# Patient Record
Sex: FEMALE | Race: WHITE | NOT HISPANIC OR LATINO | Employment: OTHER | URBAN - METROPOLITAN AREA
[De-identification: names, ages, dates, MRNs, and addresses within clinical notes are randomized per-mention and may not be internally consistent; named-entity substitution may affect disease eponyms.]

---

## 2024-03-23 ENCOUNTER — HOSPITAL ENCOUNTER (OUTPATIENT)
Facility: HOSPITAL | Age: 64
Discharge: HOME | End: 2024-03-23
Attending: EMERGENCY MEDICINE
Payer: MEDICARE

## 2024-03-23 VITALS
OXYGEN SATURATION: 98 % | HEART RATE: 98 BPM | BODY MASS INDEX: 21 KG/M2 | DIASTOLIC BLOOD PRESSURE: 55 MMHG | WEIGHT: 123 LBS | RESPIRATION RATE: 18 BRPM | HEIGHT: 64 IN | SYSTOLIC BLOOD PRESSURE: 127 MMHG | TEMPERATURE: 97.9 F

## 2024-03-23 DIAGNOSIS — Z79.01 CHRONIC ANTICOAGULATION: ICD-10-CM

## 2024-03-23 DIAGNOSIS — S51.811A SKIN TEAR OF FOREARM WITHOUT COMPLICATION, RIGHT, INITIAL ENCOUNTER: Primary | ICD-10-CM

## 2024-03-23 PROCEDURE — 25010000002 TETANUS-DIPHTH-ACELL PERTUSSIS 5-2.5-18.5 LF-MCG/0.5 SUSPENSION PREFILLED SYRINGE: Performed by: NURSE PRACTITIONER

## 2024-03-23 PROCEDURE — 90471 IMMUNIZATION ADMIN: CPT | Performed by: NURSE PRACTITIONER

## 2024-03-23 PROCEDURE — 99204 OFFICE O/P NEW MOD 45 MIN: CPT | Performed by: NURSE PRACTITIONER

## 2024-03-23 PROCEDURE — G0463 HOSPITAL OUTPT CLINIC VISIT: HCPCS | Performed by: NURSE PRACTITIONER

## 2024-03-23 PROCEDURE — 90715 TDAP VACCINE 7 YRS/> IM: CPT | Performed by: NURSE PRACTITIONER

## 2024-03-23 RX ORDER — CEPHALEXIN 500 MG/1
500 CAPSULE ORAL 3 TIMES DAILY
Qty: 21 CAPSULE | Refills: 0 | Status: SHIPPED | OUTPATIENT
Start: 2024-03-23 | End: 2024-03-30

## 2024-03-23 RX ORDER — AMITRIPTYLINE HYDROCHLORIDE 25 MG/1
1 TABLET, FILM COATED ORAL DAILY
COMMUNITY
Start: 2012-05-22

## 2024-03-23 RX ORDER — METOPROLOL SUCCINATE 50 MG/1
1 TABLET, EXTENDED RELEASE ORAL DAILY
COMMUNITY

## 2024-03-23 RX ORDER — CITALOPRAM 20 MG/1
1 TABLET ORAL DAILY
COMMUNITY
Start: 2012-05-22

## 2024-03-23 RX ORDER — ALBUTEROL SULFATE 90 UG/1
AEROSOL, METERED RESPIRATORY (INHALATION)
COMMUNITY

## 2024-03-23 RX ORDER — CYCLOBENZAPRINE HCL 10 MG
1 TABLET ORAL 2 TIMES DAILY PRN
COMMUNITY

## 2024-03-23 RX ORDER — APIXABAN 2.5 MG/1
1 TABLET, FILM COATED ORAL 2 TIMES DAILY
COMMUNITY

## 2024-03-23 RX ORDER — BUMETANIDE 0.5 MG/1
1 TABLET ORAL DAILY
COMMUNITY

## 2024-03-23 RX ORDER — MELATONIN
1000 DAILY
COMMUNITY

## 2024-03-23 RX ORDER — HYDROCODONE BITARTRATE AND ACETAMINOPHEN 10; 325 MG/1; MG/1
1 TABLET ORAL 4 TIMES DAILY
COMMUNITY
Start: 2012-05-29

## 2024-03-23 RX ORDER — OMEPRAZOLE 40 MG/1
1 CAPSULE, DELAYED RELEASE ORAL DAILY
COMMUNITY
Start: 2012-02-22

## 2024-03-23 RX ORDER — LISINOPRIL 20 MG/1
1 TABLET ORAL 2 TIMES DAILY
COMMUNITY

## 2024-03-23 RX ADMIN — TETANUS TOXOID, REDUCED DIPHTHERIA TOXOID AND ACELLULAR PERTUSSIS VACCINE, ADSORBED 0.5 ML: 5; 2.5; 8; 8; 2.5 SUSPENSION INTRAMUSCULAR at 13:09

## 2024-03-23 NOTE — DISCHARGE INSTRUCTIONS
Leave the underneath mesh like dressing in place for 7 days.    If dressing falls off before then, it is ok.    You will only need to change the outer white gauze dressing.    Make an appointment with Wound care center for follow up.  Can also see Primary Care Provider.    Return Precautions    Although you are being discharged from the ED today, I encourage you to return for worsening symptoms.  Things can, and do, change such that treatment at home with medication may not be adequate.      Specifically, return for any of the following:    Chest pain, shortness of breath, pain or nausea and vomiting not controlled by medications provided.    Please make a follow up with your Primary Care Provider for a blood pressure recheck.       START ANTIBIOTICS FOR INCREASED PAIN, RED STREAKS UP THE ARM OR FOUL DRAINAGE FROM WOUND.

## 2025-03-13 ENCOUNTER — OFFICE VISIT (OUTPATIENT)
Age: 65
End: 2025-03-13
Payer: MEDICARE

## 2025-03-13 VITALS — HEIGHT: 63 IN | HEART RATE: 86 BPM | WEIGHT: 123 LBS | OXYGEN SATURATION: 100 % | BODY MASS INDEX: 21.79 KG/M2

## 2025-03-13 DIAGNOSIS — M79.671 BILATERAL FOOT PAIN: Primary | ICD-10-CM

## 2025-03-13 DIAGNOSIS — M21.862 ACQUIRED POSTERIOR EQUINUS OF BOTH LOWER EXTREMITIES: ICD-10-CM

## 2025-03-13 DIAGNOSIS — M21.622 TAILOR'S BUNION OF BOTH FEET: ICD-10-CM

## 2025-03-13 DIAGNOSIS — L85.2 PUNCTATE KERATOSIS: ICD-10-CM

## 2025-03-13 DIAGNOSIS — M79.672 BILATERAL FOOT PAIN: Primary | ICD-10-CM

## 2025-03-13 DIAGNOSIS — M21.621 TAILOR'S BUNION OF BOTH FEET: ICD-10-CM

## 2025-03-13 DIAGNOSIS — L90.9 FAT PAD ATROPHY OF FOOT: ICD-10-CM

## 2025-03-13 DIAGNOSIS — M21.861 ACQUIRED POSTERIOR EQUINUS OF BOTH LOWER EXTREMITIES: ICD-10-CM

## 2025-03-14 NOTE — PROGRESS NOTES
03/13/2025  Foot and Ankle Surgery - New Patient   Provider: Dr. Jose Chinchilla DPM  Location: [facility]    Subjective:  Silke Arreguin is a 64 y.o. female.     Chief Complaint   Patient presents with    Left Foot - Initial Evaluation, Pain, Bunions     On the great toe and the pinky toe    Right Foot - Initial Evaluation, Pain     She has pain under her 2nd through 5th toes on the ball of her foot    Initial Evaluation     Luiza Terrazas, APRN  2/14/25       History of Present Illness  The patient presents for evaluation of left foot pain.    She has been experiencing persistent pain in her left foot since the end of January 2025. Despite having a long-standing bunion, she reports no previous discomfort associated with it. She also mentions a similar sensation in her right foot, although she does not believe she has a bunion on that side. She reports no swelling in the affected area. An MRI conducted at [facility] revealed a neuroma in the 2nd and 3rd metatarsals. She has limited visibility of the plantar aspect of her foot. Her primary care physician at [facility] conducted blood tests and an x-ray, which revealed arthritis. She has been wearing [employer]chers shoes and spends most of her time in flip-flops. She has observed some changes in the skin of her feet. She is unable to participate in low-impact exercises such as biking, elliptical, swimming, water aerobics, or yoga due to spinal issues. However, she maintains an active lifestyle through household chores.       Allergies   Allergen Reactions    Aspirin Anaphylaxis, Other (See Comments) and Unknown - High Severity    Milnacipran Nausea And Vomiting    Nsaids Other (See Comments)    Amlodipine Rash    Hydrochlorothiazide Rash    Sulfa Antibiotics Rash and Unknown - High Severity       Past Medical History:   Diagnosis Date    COPD (chronic obstructive pulmonary disease)     Coronary artery disease     Depression     Hyperlipidemia   "   Hypertension        Past Surgical History:   Procedure Laterality Date    ABDOMINAL SURGERY      CAROTID ENDARTERECTOMY      HYSTERECTOMY         History reviewed. No pertinent family history.    Social History     Socioeconomic History    Marital status:    Tobacco Use    Smoking status: Every Day     Current packs/day: 0.50     Average packs/day: 0.5 packs/day for 52.2 years (26.1 ttl pk-yrs)     Types: Cigarettes     Start date: 1973   Vaping Use    Vaping status: Former   Substance and Sexual Activity    Alcohol use: Not Currently    Drug use: Never    Sexual activity: Not Currently        Current Outpatient Medications on File Prior to Visit   Medication Sig Dispense Refill    albuterol sulfate  (90 Base) MCG/ACT inhaler INHALE TWO PUFFS EVERY 4 HOURS      amitriptyline (ELAVIL) 25 MG tablet Take 1 tablet by mouth Daily.      bumetanide (BUMEX) 0.5 MG tablet Take 1 tablet by mouth Daily.      Cholecalciferol 25 MCG (1000 UT) tablet Take 1 tablet by mouth Daily.      citalopram (CeleXA) 20 MG tablet Take 1 tablet by mouth Daily.      cyclobenzaprine (FLEXERIL) 10 MG tablet Take 1 tablet by mouth 2 (Two) Times a Day As Needed.      Eliquis 2.5 MG tablet tablet Take 1 tablet by mouth 2 (Two) Times a Day.      HYDROcodone-acetaminophen (NORCO)  MG per tablet Take 1 tablet by mouth 4 (Four) Times a Day.      lisinopril (PRINIVIL,ZESTRIL) 20 MG tablet Take 1 tablet by mouth 2 (Two) Times a Day.      metoprolol succinate XL (TOPROL-XL) 50 MG 24 hr tablet Take 1 tablet by mouth Daily.      omeprazole (priLOSEC) 40 MG capsule Take 1 capsule by mouth Daily.       No current facility-administered medications on file prior to visit.         Objective   Pulse 86   Ht 160 cm (63\")   Wt 55.8 kg (123 lb)   SpO2 100%   Breastfeeding No   BMI 21.79 kg/m²     Foot/Ankle Exam    GENERAL  Appearance:  appears stated age  Orientation:  AAOx3  Affect:  appropriate    VASCULAR     Right Foot Vascularity "   Normal vascular exam    Dorsalis pedis:  2+  Posterior tibial:  2+  Skin temperature:  warm  Edema grading:  None  CFT:  < 3 seconds  Pedal hair growth:  Present  Varicosities:  none     Left Foot Vascularity   Normal vascular exam    Dorsalis pedis:  2+  Posterior tibial:  2+  Skin temperature:  warm  Edema grading:  None  CFT:  < 3 seconds  Pedal hair growth:  Present  Varicosities:  none     NEUROLOGIC     Right Foot Neurologic   Light touch sensation: normal  Hot/Cold sensation: normal  Achilles reflex:  2+     Left Foot Neurologic   Light touch sensation: normal  Hot/Cold sensation:  normal  Achilles reflex:  2+    MUSCULOSKELETAL     Right Foot Musculoskeletal   Ecchymosis:  none  Tenderness:  metatarsals tenderness    Arch:  Normal     Left Foot Musculoskeletal   Ecchymosis:  none  Tenderness:  callous left foot and metatarsals tenderness  Arch:  Normal    MUSCLE STRENGTH     Right Foot Muscle Strength   Normal strength    Foot dorsiflexion:  5  Foot plantar flexion:  5  Foot inversion:  5  Foot eversion:  5     Left Foot Muscle Strength   Normal strength    Foot dorsiflexion:  5  Foot plantar flexion:  5  Foot inversion:  5  Foot eversion:  5    DERMATOLOGIC      Right Foot Dermatologic   Skin  Right foot skin is intact. Negative for ulcer.   Nails comment:  Nails 1-5     Left Foot Dermatologic   Skin  Positive for corn. Negative for ulcer.   Nails comment:  Nails 1-5    TESTS     Right Foot Tests   Anterior drawer: negative  Varus tilt: negative     Left Foot Tests   Anterior drawer: negative  Varus tilt: negative     Right foot additional comments: Mild to moderate tailor's bunion deformities to the feet.     Left foot additional comments: Hyperkeratotic lesion with central core consistent with a punctate keratosis to the plantar aspect of the left fifth metatarsal head region.  No underlying open wound or signs of infection.  Moderate equinus contracture with knee extended and flexed to both lower  extremities.  Mild leg atrophy    Physical Exam         Results  Imaging  X-ray showed arthritis in the left foot. MRI showed a neuroma in the 2nd and 3rd metatarsals of the left foot.       Assessment & Plan   Diagnoses and all orders for this visit:    1. Bilateral foot pain (Primary)  -     XR Foot 3+ View Right    2. Tailor's bunion of both feet    3. Punctate keratosis    4. Acquired posterior equinus of both lower extremities    5. Fat pad atrophy of foot       Assessment & Plan    The discomfort is likely due to soft tissue issues rather than bone-related problems. The presence of a bunion and a tailor's bunion was noted, but these are not causes for concern unless they become symptomatic. The pain could be attributed to changes in activity levels, footwear, or daily activities. The increased stress on the area has resulted in a callus or corn. The patient also exhibits punctate keratosis, a type of callus with a deep core that can cause discomfort during ambulation. The skin lesion is pressure-induced. The patient was reassured that there is no progressive condition necessitating surgical intervention at this time. She was advised to consider over-the-counter arch supports, which should be worn with tennis shoes for approximately 90 percent of the day, even at home. The use of barefoot, flip-flops, sandals, and flats was discouraged. Recommended shoe brands include [employer]ICS, New Balance, and Scott. She was informed that it may take 1 to 2 weeks to adjust to the new inserts. She was also advised to perform stretching exercises 3 to 5 times daily and to engage in low-impact exercises such as biking, elliptical, swimming, water aerobics, and yoga. A representative from the office will provide further information about the inserts. A list of stretching exercises will be provided. If the callus continues to cause discomfort, an acid treatment may be considered to remove the area, similar to wart treatment, to  promote skin renewal. However, this option is not being considered at present due to the potential for pain and the patient's thin skin.Reviewed proper basic stretching and manual therapy exercises along with appropriate shoes and activity.  Discussed proper use and/or avoidance of OTC anti-inflammatories.  Patient is to call with any additional issues or concerns.  Greater than 30 minutes was spent before, during, and after evaluation for patient care.    The encounter note is created with the use of AI technology.  I do apologize if there are typos and/or confusion within the note.  Please feel free to contact me or my office with any questions or concerns.    Follow-up  The patient will follow up in 6 weeks.           Patient or patient representative verbalized consent for the use of Ambient Listening during the visit with  HARIS Chinchilla DPM for chart documentation. 3/14/2025  10:31 EDT    HARIS Chinchilla DPM